# Patient Record
Sex: MALE | Race: WHITE | NOT HISPANIC OR LATINO | URBAN - METROPOLITAN AREA
[De-identification: names, ages, dates, MRNs, and addresses within clinical notes are randomized per-mention and may not be internally consistent; named-entity substitution may affect disease eponyms.]

---

## 2022-01-10 ENCOUNTER — OFFICE (OUTPATIENT)
Dept: URBAN - METROPOLITAN AREA CLINIC 34 | Facility: CLINIC | Age: 42
End: 2022-01-10

## 2022-01-10 VITALS — TEMPERATURE: 98.1 F | WEIGHT: 164 LBS | HEIGHT: 70 IN

## 2022-01-10 DIAGNOSIS — R19.8 OTHER SPECIFIED SYMPTOMS AND SIGNS INVOLVING THE DIGESTIVE S: ICD-10-CM

## 2022-01-10 DIAGNOSIS — R12 HEARTBURN: ICD-10-CM

## 2022-01-10 DIAGNOSIS — K21.9 GASTRO-ESOPHAGEAL REFLUX DISEASE WITHOUT ESOPHAGITIS: ICD-10-CM

## 2022-01-10 PROCEDURE — 99204 OFFICE O/P NEW MOD 45 MIN: CPT

## 2022-01-10 NOTE — SERVICEHPINOTES
SOBEIDA LANG   is a   41   year old male who is being seen in consultation at the request of   LEON GARCIA   for abd pain and chest pain. He reports chest pain/pressure and acid reflux sx beginning in Nov, never had issues with this in the past. Pain radiated to back. Symptoms persisted x 3 days and ended up going to ER 11/28--cardiac workup unremarkable. He was given omeprazole 20mg and pepcid 20mg BID which has helped. Ran out of pepcid, still has a couple days left of omeprazole (taking qAM). He is no longer having any symptoms but did miss a day or so of PPI with returning heartburn. +globus sensation previously, this has resolved. No abd pain, n/v, dysphagia, or odynophagia. Denies NSAIDs. Smokes 3-4 cigarettes per day. No etoh use. Drinks coffee 2 cups daily. No prior EGD.
br BMs regular, denies constipation, diarrhea, rectal bleeding, or melena.

## 2022-01-11 ENCOUNTER — OFFICE (OUTPATIENT)
Dept: URBAN - METROPOLITAN AREA CLINIC 30 | Facility: CLINIC | Age: 42
End: 2022-01-11

## 2022-01-11 VITALS
SYSTOLIC BLOOD PRESSURE: 103 MMHG | DIASTOLIC BLOOD PRESSURE: 60 MMHG | TEMPERATURE: 98.1 F | WEIGHT: 164 LBS | HEART RATE: 66 BPM | RESPIRATION RATE: 16 BRPM | HEART RATE: 60 BPM | DIASTOLIC BLOOD PRESSURE: 53 MMHG | HEIGHT: 70 IN | TEMPERATURE: 98.7 F | DIASTOLIC BLOOD PRESSURE: 71 MMHG | OXYGEN SATURATION: 99 % | OXYGEN SATURATION: 98 % | SYSTOLIC BLOOD PRESSURE: 92 MMHG | SYSTOLIC BLOOD PRESSURE: 109 MMHG

## 2022-01-11 DIAGNOSIS — K21.9 GASTRO-ESOPHAGEAL REFLUX DISEASE WITHOUT ESOPHAGITIS: ICD-10-CM
